# Patient Record
(demographics unavailable — no encounter records)

---

## 2024-11-20 NOTE — HISTORY OF PRESENT ILLNESS
[FreeTextEntry1] : Administered Influenza Vaccine  0.5ml IM in Left deltoid. Administered COVID-19 vaccine 0.25ml IM in Left deltoid. Patient tolerated vaccines well.  Parent provided with VIS statement.  Advised to call with questions or concerns.

## 2024-12-11 NOTE — HISTORY OF PRESENT ILLNESS
[FreeTextEntry6] :   sore throat and intermittent headache x 2 days denies fever, cough, congestion, rhinorrhea, nausea, vomiting, rash, or recent travel. brother currently sick with a uri symptoms and rash.

## 2024-12-11 NOTE — PHYSICAL EXAM
[Erythematous Oropharynx] : erythematous oropharynx [Enlarged Tonsils] : enlarged tonsils [NL] : warm, clear report given to holding AMARIS Ladd

## 2024-12-11 NOTE — DISCUSSION/SUMMARY
[FreeTextEntry1] :  Patient likely with viral pharyngitis.  Rapid strep performed in office is negative.  Will send throat culture to rule out strep.  Recommend supportive care with antipyretics, salt water gargles, and if age-appropriate throat lozenges. To call with questions or concerns. RTC for WCC or sooner as needed.